# Patient Record
Sex: MALE | URBAN - METROPOLITAN AREA
[De-identification: names, ages, dates, MRNs, and addresses within clinical notes are randomized per-mention and may not be internally consistent; named-entity substitution may affect disease eponyms.]

---

## 2018-02-09 ENCOUNTER — TELEPHONE (OUTPATIENT)
Dept: TRANSPLANT | Facility: CLINIC | Age: 63
End: 2018-02-09

## 2018-02-15 ENCOUNTER — TELEPHONE (OUTPATIENT)
Dept: TRANSPLANT | Facility: CLINIC | Age: 63
End: 2018-02-15

## 2018-02-15 NOTE — LETTER
2018    Yulisa Arriaga  [address]  [address] [place] [id number]  Phone: [phone]  Fax: [phone]       Dear Dr. Yulisa Arriaga,     Patient:  Andrews Fair  MRN:  [id number]  :  1955    Thank you for referring Andrews Fair to our lung transplant program.  Upon reviewing the medical records provided to our office, we find that Andrews Fair is not a potential candidate for lung transplantation at [facility] due to his elevated BMI of 41.  If the patient is able to attain a BMI of less than 34, please refer him again at that time.    Andrews Fair has the option of being referred by your office for lung transplant evaluation at another center.    Once again, we appreciate your referral to our center.  We look forward to working with you in the future.  If you have any questions or concerns regarding this decision, then please do not hesitate to contact me at [phone].    Sincerely,         Good Johns MD   Director, Lung Transplantation   Pulmonary & Critical Care Medicine    [facility]  [address] [place] [id number]  [phone]

## 2018-02-15 NOTE — TELEPHONE ENCOUNTER
2/9/18 - Received a referral for lung transplant from Sharon with Dr. Han's office.  Minimal records received.  Attempted to contact Sharon.  No answer.  Left a message requesting additional records including demographic and insurance information.    2/12/18 - Received a return call from Sharon.  She stated that she is unable to fax records.  She stated that records are in [facility].  Informed Sharon that I don't have anyone with that name and date of birth in Epic.  Asked for MRN.  Informed Sharon that when I entered the MRN, I received a message that there is no patient with that MRN.  She stated that she will scan the records.    2/12/18 - Received a fax with medical records.  Demographics and insurance information not received.    2/12/18 - Left a message with Jenae Herrera that demographics and insurance information is needed.    2/14/18 - Received demographics and insurance information.    Sharon left a message inquiring if records were received.  Contacted Sharon.  Informed her that insurance and demographic information was received.  Informed her that records will be reviewed with Dr. Johns.

## 2018-02-16 NOTE — TELEPHONE ENCOUNTER
Notified patient that he would not be a candidate for consult due to his elevated BMI of 41.  Pt states that he is down to 250 pounds from 300.  He is in hospitalized at [facility] right now.  Explained that I would need a current weight, and that the nurse should be able to get a standing weight at the bedside.  He will call me back to get the fax number that she will need to send the documented weight to.